# Patient Record
Sex: MALE | Race: WHITE | Employment: UNEMPLOYED | URBAN - METROPOLITAN AREA
[De-identification: names, ages, dates, MRNs, and addresses within clinical notes are randomized per-mention and may not be internally consistent; named-entity substitution may affect disease eponyms.]

---

## 2017-01-01 ENCOUNTER — OFFICE VISIT (OUTPATIENT)
Dept: INTERNAL MEDICINE CLINIC | Age: 62
End: 2017-01-01

## 2017-01-01 ENCOUNTER — TELEPHONE (OUTPATIENT)
Dept: INTERNAL MEDICINE CLINIC | Age: 62
End: 2017-01-01

## 2017-01-01 ENCOUNTER — HOSPITAL ENCOUNTER (OUTPATIENT)
Dept: LAB | Age: 62
Discharge: HOME | End: 2017-06-28
Payer: COMMERCIAL

## 2017-01-01 ENCOUNTER — DOCUMENTATION ONLY (OUTPATIENT)
Dept: INTERNAL MEDICINE CLINIC | Age: 62
End: 2017-01-01

## 2017-01-01 VITALS
BODY MASS INDEX: 40.82 KG/M2 | TEMPERATURE: 98.1 F | HEIGHT: 73 IN | HEART RATE: 79 BPM | OXYGEN SATURATION: 99 % | WEIGHT: 308 LBS

## 2017-01-01 VITALS
RESPIRATION RATE: 16 BRPM | HEART RATE: 34 BPM | TEMPERATURE: 98.8 F | BODY MASS INDEX: 41.35 KG/M2 | WEIGHT: 312 LBS | HEIGHT: 73 IN | OXYGEN SATURATION: 98 %

## 2017-01-01 VITALS
BODY MASS INDEX: 41.08 KG/M2 | WEIGHT: 310 LBS | OXYGEN SATURATION: 96 % | HEART RATE: 64 BPM | HEIGHT: 73 IN | TEMPERATURE: 98.3 F

## 2017-01-01 DIAGNOSIS — Z23 ENCOUNTER FOR IMMUNIZATION: ICD-10-CM

## 2017-01-01 DIAGNOSIS — E78.5 HYPERLIPIDEMIA, UNSPECIFIED HYPERLIPIDEMIA TYPE: ICD-10-CM

## 2017-01-01 DIAGNOSIS — I48.21 PERMANENT ATRIAL FIBRILLATION (HCC): ICD-10-CM

## 2017-01-01 DIAGNOSIS — G47.33 OSA (OBSTRUCTIVE SLEEP APNEA): ICD-10-CM

## 2017-01-01 DIAGNOSIS — K63.5 HYPERPLASTIC COLONIC POLYP, UNSPECIFIED PART OF COLON: ICD-10-CM

## 2017-01-01 DIAGNOSIS — I50.20 SYSTOLIC CONGESTIVE HEART FAILURE, UNSPECIFIED CONGESTIVE HEART FAILURE CHRONICITY: ICD-10-CM

## 2017-01-01 DIAGNOSIS — I10 ESSENTIAL HYPERTENSION: Primary | ICD-10-CM

## 2017-01-01 DIAGNOSIS — R73.01 IFG (IMPAIRED FASTING GLUCOSE): ICD-10-CM

## 2017-01-01 DIAGNOSIS — Z85.528 H/O RENAL CELL CARCINOMA: ICD-10-CM

## 2017-01-01 DIAGNOSIS — I10 ESSENTIAL HYPERTENSION: ICD-10-CM

## 2017-01-01 DIAGNOSIS — Z11.59 NEED FOR HEPATITIS C SCREENING TEST: ICD-10-CM

## 2017-01-01 DIAGNOSIS — I50.20 SYSTOLIC CONGESTIVE HEART FAILURE, UNSPECIFIED CONGESTIVE HEART FAILURE CHRONICITY: Primary | ICD-10-CM

## 2017-01-01 DIAGNOSIS — E66.01 OBESITY, MORBID (HCC): ICD-10-CM

## 2017-01-01 LAB
CHOLEST SERPL-MCNC: 203 MG/DL
HBA1C MFR BLD: 5.3 % (ref 4.2–5.6)
HCV AB SER IA-ACNC: 0.19 INDEX
HCV AB SERPL QL IA: NEGATIVE
HCV COMMENT,HCGAC: NORMAL
HDLC SERPL-MCNC: 40 MG/DL (ref 40–60)
HDLC SERPL: 5.1 {RATIO} (ref 0–5)
LDLC SERPL CALC-MCNC: 93.4 MG/DL (ref 0–100)
LIPID PROFILE,FLP: ABNORMAL
TRIGL SERPL-MCNC: 348 MG/DL (ref ?–150)
VLDLC SERPL CALC-MCNC: 69.6 MG/DL

## 2017-01-01 PROCEDURE — 83036 HEMOGLOBIN GLYCOSYLATED A1C: CPT | Performed by: INTERNAL MEDICINE

## 2017-01-01 PROCEDURE — 86803 HEPATITIS C AB TEST: CPT | Performed by: INTERNAL MEDICINE

## 2017-01-01 PROCEDURE — 36415 COLL VENOUS BLD VENIPUNCTURE: CPT | Performed by: INTERNAL MEDICINE

## 2017-01-01 PROCEDURE — 80061 LIPID PANEL: CPT | Performed by: INTERNAL MEDICINE

## 2017-01-01 RX ORDER — SILDENAFIL 50 MG/1
50 TABLET, FILM COATED ORAL AS NEEDED
COMMUNITY

## 2017-01-01 RX ORDER — CARVEDILOL 25 MG/1
25 TABLET ORAL 2 TIMES DAILY WITH MEALS
COMMUNITY

## 2017-01-01 RX ORDER — LIDOCAINE 50 MG/G
PATCH TOPICAL
Qty: 3 EACH | Refills: 5 | Status: SHIPPED | OUTPATIENT
Start: 2017-01-01 | End: 2017-01-01 | Stop reason: SDUPTHER

## 2017-01-01 RX ORDER — LOTEPREDNOL ETABONATE 5 MG/G
GEL OPHTHALMIC
COMMUNITY

## 2017-01-01 RX ORDER — POLYMYXIN B SULFATE AND TRIMETHOPRIM 1; 10000 MG/ML; [USP'U]/ML
1 SOLUTION OPHTHALMIC EVERY 4 HOURS
COMMUNITY

## 2017-01-01 RX ORDER — BUMETANIDE 0.5 MG/1
TABLET ORAL 4 TIMES DAILY
COMMUNITY

## 2017-01-01 RX ORDER — TRAMADOL HYDROCHLORIDE 50 MG/1
50 TABLET ORAL
Qty: 60 TAB | Refills: 0 | Status: SHIPPED | OUTPATIENT
Start: 2017-01-01

## 2017-01-01 RX ORDER — THERA TABS 400 MCG
1 TAB ORAL DAILY
COMMUNITY

## 2017-01-01 RX ORDER — NYSTATIN 100000 [USP'U]/G
POWDER TOPICAL 4 TIMES DAILY
Qty: 15 G | Refills: 3 | Status: SHIPPED | OUTPATIENT
Start: 2017-01-01

## 2017-01-01 RX ORDER — KETOCONAZOLE 20 MG/ML
5 SHAMPOO TOPICAL
Qty: 1 BOTTLE | Refills: 5 | Status: SHIPPED | OUTPATIENT
Start: 2017-01-01

## 2017-01-01 RX ORDER — TRAMADOL HYDROCHLORIDE 50 MG/1
50 TABLET ORAL
Qty: 60 TAB | Refills: 0 | Status: SHIPPED | OUTPATIENT
Start: 2017-01-01 | End: 2017-01-01 | Stop reason: SDUPTHER

## 2017-01-01 RX ORDER — ALBUTEROL SULFATE 90 UG/1
AEROSOL, METERED RESPIRATORY (INHALATION)
COMMUNITY

## 2017-01-01 RX ORDER — LIDOCAINE 50 MG/G
PATCH TOPICAL
Qty: 5 PACKAGE | Refills: 5 | Status: SHIPPED | OUTPATIENT
Start: 2017-01-01

## 2017-01-01 RX ORDER — NYSTATIN 100000 [USP'U]/G
POWDER TOPICAL 4 TIMES DAILY
Qty: 15 G | Refills: 3 | Status: SHIPPED | OUTPATIENT
Start: 2017-01-01 | End: 2017-01-01 | Stop reason: SDUPTHER

## 2017-01-01 RX ORDER — SENNOSIDES 8.6 MG/1
1 TABLET ORAL DAILY
COMMUNITY

## 2017-01-01 RX ORDER — CARBOXYMETHYLCELLULOSE SODIUM 10 MG/ML
GEL OPHTHALMIC
COMMUNITY

## 2017-01-01 RX ORDER — AMIODARONE HYDROCHLORIDE 200 MG/1
TABLET ORAL
COMMUNITY

## 2017-01-01 RX ORDER — BRIMONIDINE TARTRATE, TIMOLOL MALEATE 2; 5 MG/ML; MG/ML
1 SOLUTION/ DROPS OPHTHALMIC EVERY 12 HOURS
COMMUNITY

## 2017-06-22 NOTE — MR AVS SNAPSHOT
Visit Information Date & Time Provider Department Dept. Phone Encounter #  
 6/22/2017  2:15 PM Mathieu Huber MD Internist of [address] [id number] Your Appointments 7/3/2017  1:30 PM  
ESTABLISHED PATIENT with Connie Duvall MD  
Urology of [facility] [address]) Appt Note: Return in about 4 months (around 6/15/2017) for [place] [address]  
[phone]  
  
   
 [place] [id number]  
  
    
 9/19/2017  1:45 PM  
Office Visit with Mathieu Huber MD  
Internist of [address] [place] [address] [address] [facility]) Appt Note: 6 month  
 [address], [facility] [address] [place]  
  
   
 [address] [facility]  
  
    
 12/12/2017  1:45 PM  
Office Visit with Mathieu Huber MD  
Internist of [address] [place] [address]) Appt Note: 6 mo f/u  
 [address] [facility], [address] [place] [address]  
[phone] Upcoming Health Maintenance Date Due Hepatitis C Screening 1955 LIPID PANEL Q1 11/17/2015 INFLUENZA AGE 9 TO ADULT 8/1/2017 HEMOGLOBIN A1C Q6M 12/22/2017 EYE EXAM RETINAL OR DILATED Q1 2/15/2018 FOOT EXAM Q1 6/22/2018 MICROALBUMIN Q1 6/22/2018 COLONOSCOPY 2/1/2019 DTaP/Tdap/Td series (2 - Td) 3/30/2026 Allergies as of 6/22/2017  Review Complete On: 6/22/2017 By: Mathieu Huber MD  
  
 Severity Noted Reaction Type Reactions Advair Diskus [Fluticasone-salmeterol]    Other (comments) Hot flashes, back pain. Celecoxib  11/10/2014    Swelling Swelling of tongue Crestor [Rosuvastatin]  06/30/2011    Other (comments) Aches. Iodinated Contrast- Oral And Iv Dye  11/10/2014    Other (comments) Loss of consciousness Lisinopril    Swelling Neosporin [Neomycin-bacitracin-polymyxin]    Unable to Obtain Niacin    Unable to Obtain Zolpidem  11/10/2014    Other (comments) [place] Hensen, disconnected LVAD pump, confusion Current Immunizations  Never Reviewed Name Date Influenza Vaccine 9/11/2014 Influenza Vaccine (Quad) PF 3/14/2017 Pneumococcal Conjugate (PCV-13) 3/30/2016 Pneumococcal Polysaccharide (PPSV-23) 3/14/2017 Tdap 3/30/2016 Zoster 7/21/2011 Not reviewed this visit Vitals Pulse Temp Height(growth percentile) Weight(growth percentile) SpO2 BMI  
 64 98.3 °F (36.8 °C) (Oral) 6' 1\" (1.854 m) 310 lb (140.6 kg) 96% 40.9 kg/m2 Smoking Status Former Smoker Vitals History BMI and BSA Data Body Mass Index Body Surface Area 40.9 kg/m 2 2.69 m 2 Preferred Pharmacy Pharmacy Name Phone [id number] MALINDA Orozco - [address] Your Updated Medication List  
  
   
This list is accurate as of: 6/22/17  3:04 PM.  Always use your most recent med list.  
  
  
  
  
 ALPHAGAN P 0.1 % ophthalmic solution Generic drug:  brimonidine  
every eight (8) hours. amLODIPine 10 mg tablet Commonly known as:  Ewkirby Loren Take  by mouth daily. aspirin 325 mg tablet Commonly known as:  ASPIRIN Take 325 mg by mouth daily. atropine 1 % ophthalmic solution Administer 1 Drop to left eye three (3) times daily. bumetanide 0.5 mg tablet Commonly known as:  Meldanielle Martinezer Take  by mouth two (2) times a day. COLACE 100 mg capsule Generic drug:  docusate sodium Take 100 mg by mouth two (2) times a day. COMBIGAN 0.2-0.5 % Drop ophthalmic solution Generic drug:  brimonidine-timolol 1 Drop every twelve (12) hours. ergocalciferol 50,000 unit capsule Commonly known as:  ERGOCALCIFEROL Take 50,000 Units by mouth. ferrous sulfate 325 mg (65 mg iron) tablet Take  by mouth three (3) times daily (with meals). ketoconazole 2 % shampoo Commonly known as:  NIZORAL Apply 5 mL to affected area every seven (7) days. lidocaine 5 % Commonly known as:  Sulaiman Veloz Apply patch to the affected area for 12 hours a day and remove for 12 hours a day. lisinopril 20 mg tablet Commonly known as:  Karene Bolglendy Take  by mouth daily. LOTEMAX 0.5 % Drpg Generic drug:  loteprednol etabonate Apply  to eye.  
  
 neomycin-bacitracin-polymyxin 3.5mg-400 unit- 5,000 unit/gram ointment Commonly known as:  NEOSPORIN Apply  to affected area two (2) times a day. nystatin powder Commonly known as:  MYCOSTATIN Apply  to affected area four (4) times daily. omeprazole 40 mg capsule Commonly known as:  PRILOSEC Take 40 mg by mouth daily. sodium chloride 0.9 % soln 0.1 mL with vancomycin 500 mg solr 1 mg  
1 mg by IntraVITreal route two (2) times a day. tobramycin-dexamethasone ophthalmic ointment Commonly known as:  Beaulah Neither Administer 1 Drop to left eye three (3) times daily. traMADol 50 mg tablet Commonly known as:  ULTRAM  
Take 1 Tab by mouth every six (6) hours as needed. Max Daily Amount: 200 mg.  
  
 warfarin 5 mg tablet Commonly known as:  COUMADIN Take 5 mg by mouth daily. Prescriptions Printed Refills  
 traMADol (ULTRAM) 50 mg tablet 0 Sig: Take 1 Tab by mouth every six (6) hours as needed. Max Daily Amount: 200 mg. Class: Print Route: Oral  
 nystatin (MYCOSTATIN) powder 3 Sig: Apply  to affected area four (4) times daily. Class: Print Route: Topical  
 ketoconazole (NIZORAL) 2 % shampoo 5 Sig: Apply 5 mL to affected area every seven (7) days. Class: Print Route: Topical  
  
Prescriptions Sent to Pharmacy Refills  
 lidocaine (LIDODERM) 5 % 5 Sig: Apply patch to the affected area for 12 hours a day and remove for 12 hours a day. Class: Normal  
 Pharmacy[id number] - [address], [address] [facility] #: [phone] Introducing [facility] SERVICES! Jam Yepez introduces [employer] patient portal. Now you can access parts of your medical record, email your doctor's office, and request medication refills online. 1. In your internet browser, go to [email or web address] 2. Click on the First Time User? Click Here link in the Sign In box. You will see the New Member Sign Up page. 3. Enter your [employer] Access Code exactly as it appears below. You will not need to use this code after youve completed the sign-up process. If you do not sign up before the expiration date, you must request a new code. · [employer] Access Code: [email or web address] Expires: 9/20/2017  3:03 PM 
 
4. Enter the last four digits of your Social Security Number (xxxx) and Date of Birth (mm/dd/yyyy) as indicated and click Submit. You will be taken to the next sign-up page. 5. Create a [employer]Ch[employer] ID. This will be your [employer]Ch[employer] login ID and cannot be changed, so think of one that is secure and easy to remember. 6. Create a [employer] password. You can change your password at any time. 7. Enter your Password Reset Question and Answer. This can be used at a later time if you forget your password. 8. Enter your e-mail address. You will receive e-mail notification when new information is available in [address] 9. Click Sign Up. You can now view and download portions of your medical record. 10. Click the Download Summary menu link to download a portable copy of your medical information. If you have questions, please visit the Frequently Asked Questions section of the [employer] website. Remember, [employer] is NOT to be used for urgent needs. For medical emergencies, dial 911. Now available from your iPhone and Android! Please provide this summary of care documentation to your next provider. Your primary care clinician is listed as Ellen Andrew. If you have any questions after today's visit, please call [phone].

## 2017-06-23 NOTE — PROGRESS NOTES
60 y.o. white male who presents for evaluation. He is doing well cardiac wise and continues to f/u w Dr Jessica McconnellShavonne Ly is planned for his continued indefinite ceftaroline per ID. He tries to walk but fairly limited. Reports that his sugars have been in good range. Denies polyuria, polydipsia, nocturia, vision change. No gi or gu complaints.   Dr Armin Wong sched the f/u US for the left kidney cyst/mass    Past Medical History:   Diagnosis Date    Asbestosis(501) [facility])     Dr. Aashish Wellington; RLD 2/13 pfts fvc 69, fev1 76, ratio 108%, dlco 63%    Calculus of kidney     CHF and cardiomyopathy 3/12 Dr. Oly Tavera     s/p Heartmate II and resection left atrial appendage Dr Mathis Aus 2/13; torn drivelline sheath w exposed wires s/p clam shell repait per [employer] 9/13; pump thrombus 5/14; s/p HM2 explantation 9/16 w HVAD implantation for recurring bacteremia; on indef ceftaroline per ID    Colon polyps     Diabetes mellitus ([address]     controlled off meds    Hemangioma 1996    right frontal Dr. Zain Alonso Hemorrhage of left orbit 11/2015    postop overanticoagulation    Hemorrhoids     Hyperlipidemia     Hypertension     Hypovitaminosis D     Mitral valve disease     Morbid obesity with BMI of 40.0-44.9, adult ([facility])     peak weight 316 lbs, bmi 41.7 from 11/14    NSVT (nonsustained ventricular tachycardia) ([facility])     JIMMIE (obstructive sleep apnea) 2004    Dr. Aashish Wellington in past;  AHI 70, RDI 71 on cpap [facility] currently    PUD (peptic ulcer disease) 1989    Dr. 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; 9/14 Dr Kelsey Schmitz; 11/15 gpc on cx Dr Vern Pierce; 1/16 staph epi neg indium/REGINALDO tx'ed dapto; 3/16 gpc tx'ed dapto Dr Kelsey Schmitz;    [address] [facility] [address] Subarachnoid hemorrhage [facility])     due to overanticoagulation 11/15    Transaminasemia 11/12    resolved    Venous insufficiency      Past Surgical History:   Procedure Laterality Date    CARDIAC CATHETERIZATION  2004     Aguilar clean cornonaries, EF 65%, nl valves, nl B renal aa    CARDIAC SURG PROCEDURE UNLIST  2/13    s/p LVAD placement Healthmate II    CARDIAC SURG PROCEDURE UNLIST      IV antibiotics for infection on dry line that goes itno hear  f    CARDIAC SURG PROCEDURE UNLIST  9/12    s/p AICD    HX APPENDECTOMY      HX CATARACT REMOVAL      2013 bilat [facility]    HX COLONOSCOPY      Dr. Zaid Blood 2/09 polyps/divertics    HX HEENT      HX HEENT      laser surgery    HX ORTHOPAEDIC      heel fracture    HX OTHER SURGICAL  09/2016    LVAD replacement     OH SIGMOIDOSCOPY FLX NDSC US XM  4/11     Social History     Social History    Marital status:      Spouse name: N/A    Number of children: N/A    Years of education: N/A     Occupational History    Not on file. Social History Main Topics    Smoking status: Former Smoker    Smokeless tobacco: Not on file    Alcohol use 1.5 oz/week     3 Cans of beer per week    Drug use: No    Sexual activity: Not on file     Other Topics Concern    Not on file     Social History Narrative     Current Outpatient Prescriptions   Medication Sig    brimonidine (ALPHAGAN P) 0.1 % ophthalmic solution every eight (8) hours.  brimonidine-timolol (COMBIGAN) 0.2-0.5 % drop ophthalmic solution 1 Drop every twelve (12) hours.  loteprednol etabonate (LOTEMAX) 0.5 % drpg Apply  to eye.  traMADol (ULTRAM) 50 mg tablet Take 1 Tab by mouth every six (6) hours as needed. Max Daily Amount: 200 mg.    nystatin (MYCOSTATIN) powder Apply  to affected area four (4) times daily.  ketoconazole (NIZORAL) 2 % shampoo Apply 5 mL to affected area every seven (7) days.  lidocaine (LIDODERM) 5 % Apply patch to the affected area for 12 hours a day and remove for 12 hours a day.  bumetanide (BUMEX) 0.5 mg tablet Take  by mouth two (2) times a day.  atropine 1 % ophthalmic solution Administer 1 Drop to left eye three (3) times daily.     tobramycin-dexamethasone (TOBRADEX) ophthalmic ointment Administer 1 Drop to left eye three (3) times daily.  neomycin-bacitracin-polymyxin (NEOSPORIN) 3.5mg-400 unit- 5,000 unit/gram ointment Apply  to affected area two (2) times a day.  sodium chloride 0.9 % soln 0.1 mL with vancomycin 500 mg solr 1 mg 1 mg by IntraVITreal route two (2) times a day.  amLODIPine (NORVASC) 10 mg tablet Take  by mouth daily.  warfarin (COUMADIN) 5 mg tablet Take 5 mg by mouth daily.  lisinopril (PRINIVIL, ZESTRIL) 20 mg tablet Take  by mouth daily.  omeprazole (PRILOSEC) 40 mg capsule Take 40 mg by mouth daily.  aspirin (ASPIRIN) 325 mg tablet Take 325 mg by mouth daily.  ergocalciferol (ERGOCALCIFEROL) 50,000 unit capsule Take 50,000 Units by mouth.  docusate sodium (COLACE) 100 mg capsule Take 100 mg by mouth two (2) times a day.  ferrous sulfate 325 mg (65 mg iron) tablet Take  by mouth three (3) times daily (with meals). No current facility-administered medications for this visit. Allergies   Allergen Reactions    Advair Diskus [Fluticasone-Salmeterol] Other (comments)     Hot flashes, back pain.  Celecoxib Swelling     Swelling of tongue    Crestor [Rosuvastatin] Other (comments)     Aches.     Iodinated Contrast- Oral And Iv Dye Other (comments)     Loss of consciousness     Lisinopril Swelling    Neosporin [Neomycin-Bacitracin-Polymyxin] Unable to Obtain    Niacin Unable to Obtain    Zolpidem Other (comments)     Wood[place] Jewel, disconnected LVAD pump, confusion     REVIEW OF SYSTEMS: sees [facility], colo 2009 Dr Lu Randolph no vision change or eye pain  Oral  no mouth pain, tongue or tooth problems  Ears  no hearing loss, ear pain, fullness, no swallowing problems  Chest  no breast masses  Resp  no wheezing, chronic coughing, dyspnea  GI  no heartburn, nausea, vomiting, change in bowel habits, bleeding, hemorrhoids  Urinary  no dysuria, hematuria, flank pain, urgency, frequency  Genitals  no genital lesions, discharge, masses, ulceration, warts  Derm  no nail abnormalities, rashes, lesions of note, hair loss  Psych  denies any anxiety or depression symptoms, no hallucinations or violent ideation  Constitutional  no wt loss, night sweats, unexplained fevers    Visit Vitals    Pulse 64    Temp 98.3 °F (36.8 °C) (Oral)    Ht 6' 1\" (1.854 m)    Wt 310 lb (140.6 kg)    SpO2 96%    BMI 40.9 kg/m2   A&O x3  Affect is appropriate. Mood stable  No apparent distress  Anicteric, no JVD, adenopathy or thyromegaly. Left orbit is patched up  No carotid bruits or radiated murmur  Lungs clear to auscultation, no wheezes or rales  Heart showed no murmur, rubs, gallops  Abdomen soft nontender, no hepatosplenomegaly or masses. Extremities without edema.   Pulses 1-2+ symmetrically    LABS  From 12/11 showed gluc 94,   cr 1.06, gfr 78,   alt 40, hba1c 5.4, ldl-p 1446,                                                         wbc 6.7,  hb 15.8, plt 305, psa 1.20  From 4/12 showed   gluc 89,   cr 1.87, gfr 39  From 5/12 showed   gluc 139, cr 1.54, gfr 49  From 7/12 showed   gluc 92,   cr 1.60, gfr 47,   alt 60, hba1c 6.3,                   chol 173, tg 107, hdl 35, ldl-c 117, wbc 8.2,   hb 17.6, plt 300, tsh 6.10  From 10/12 showed gluc 96,   cr 1.35, gfr 57,        chol 157, tg 112, hdl 28, ldl-c 107  From 10/13 showed gluc 92,   cr 1.40, gfr>59,                                wbc 10.7, hb 13.0, plt 309  From 3/14 showed    cr 1.10,               hb 12.1,       psa 1.32  From 9/14 showed                      tsh 1.71  From 10/14 showed gluc 105, cr 0.90, gfr>60,  alt 11,                 wbc 7.1,   hb 11.1, plt 310  From 11/14 showed         hba1c 5.5,         chol 145, tg 175, hdl 34, ldl-c 76,   umar 16  From 6/15 showed   gluc 97,   cr 1.00, gfr>60,  alt 14,                  wbc 9.9,   hb 12.2, plt 323, lip 107  From 3/16 showed   gluc 86,   cr 1.20, gfr>60,  alt 14,                  wbc 5.5,   hb 10.4, plt 201  From 9/16 showed         hba1c 5.5  From 10/16 showed                       tsh 1.95  From 3/17 showed   gluc 89,   cr 1.40,                    wbc 7.5, hb 13.5, plt 241    Patient Active Problem List   Diagnosis Code    Hyperlipidemia E78.5    JIMMIE (obstructive sleep apnea) on CPAP AHI 71 G47.33    CHF and cardiomyopathy 3/12 Dr. Carla Lennox, s/p [facility] 2/13 w explantation and replacement HVAD 9/16 I50.9    Atrial fibrillation Dr. Carla Lennox 3/12 I48.91    Colon polyp and diverticulosis Dr. Maegan Sneed 2009 K63.5    Essential hypertension I10    Obesity (BMI 30-39. 9) E66.9     Assessment and plan:  1. CM s/p HVAD. Follow up Dr Adenike Carreno. Off transplant list at this time  2. Lipids. Check labs at his convenience  3. Polyps and diverticulosis. Fiber  4. Diabetes. Off meds, f/u opth, no podiatry  5. JIMMIE on cpap  6. Obesity. Lifestyle and dietary measures  7. Urology. Per Dr Shepard [facility] 12/17    Above conditions discussed at length and patient vocalized understanding.   All questions answered to patient satifaction

## 2017-08-18 NOTE — TELEPHONE ENCOUNTER
Iram pt's nurse is calling to speak with a nurse about medication    Pt will be getting d/s and is asking if RD will prescribe iv abx?     Chetan # [phone]

## 2017-08-23 NOTE — TELEPHONE ENCOUNTER
Patient is calling because he signed agreement to let us know when he is getting pain killers from another source. He is just calling us to let us know that [facility] and [facility] pain killers.     [phone]

## 2017-12-09 NOTE — PROGRESS NOTES
60 y.o. white male who presents for evaluation. He seems to be doing ok. Stable from heart failure point of view. Ambulation is fairly limited to adls, no set exercise. He is not on transplant list due to weight    ID said no to access placement due to risk of colonization so getting his ceftaroline through the PICC line. Reports that his sugars have been in good range. Denies polyuria, polydipsia, nocturia, vision change. No gi or gu complaints.       He underwent microwave ablation of bx proven RCC, they are following the imaging every 3 mos to decide what to do next    He has continued to f/u w ophth as he's had some retinal bleeds apparently    Past Medical History:   Diagnosis Date    Acute on chronic combined systolic and diastolic heart failure ([address]     Asbestosis(501)     Dr. Wade Moore; RLD 2/13 pfts fvc 69, fev1 76, ratio 108%, dlco 63%    BPH with obstruction/lower urinary tract symptoms     Calculus of kidney     Cardiac arrhythmia     Cataracts, bilateral     CHF and cardiomyopathy 3/12 Dr. Cydney Arenas     s/p Heartmate II and resection left atrial appendage Dr Uli Amado 2/13; torn drivelline sheath w exposed wires s/p clam shell repait per [employer] 9/13; pump thrombus 5/14; s/p HM2 explantation 9/16 w HVAD implantation for recurring bacteremia; on indef ceftaroline per ID    CKD (chronic kidney disease)     Colon polyps     Complex renal cyst     Congestive heart failure ([address]     Diabetes mellitus ([address]     controlled off meds    Esophageal reflux     Glaucoma     Dr Moo Das reduced     Hemangioma 1996    right frontal Dr. Russell Fermin Hemorrhage of left orbit 11/2015    postop overanticoagulation    Hemorrhoids     History of blood transfusion     Hyperlipidemia     Hypertension     Hypovitaminosis D     ICD (implantable cardioverter-defibrillator) infection ([address]     LVAD (left ventricular assist device) present ([address]     Mitral valve disease     Morbid obesity with BMI of 40.0-44.9, adult (HCC)     peak weight 316 lbs, bmi 41.7 from 11/14    Myocardial infarct     Nonischemic cardiomyopathy (HCC)     NSVT (nonsustained ventricular tachycardia) (HCC)     JIMMIE (obstructive sleep apnea) 2004    Dr. Aristides Strong in past;  AHI 70, RDI 71 on cpap [facility] currently    PUD (peptic ulcer disease) 1989    Dr. 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 Lerner Heart clean cornonaries, EF 65%, nl valves, nl B renal aa    CARDIAC SURG PROCEDURE UNLIST  2/13    s/p LVAD placement Healthmate II    CARDIAC SURG PROCEDURE UNLIST      IV antibiotics for infection on dry line that goes itno hear  f    CARDIAC SURG PROCEDURE UNLIST  9/12    s/p AICD    HX APPENDECTOMY      HX CATARACT REMOVAL      2013 bilat [facility]    HX COLONOSCOPY      Dr. Katelynn Paulson 2/09 polyps/divertics    HX HEENT      HX HEENT      laser surgery    HX ORTHOPAEDIC      heel fracture    HX OTHER SURGICAL  09/2016    LVAD replacement     HX UROLOGICAL      LA SIGMOIDOSCOPY FLX [address] [facility]  4/11     Social History     Social History    Marital status:      Spouse name: N/A    Number of children: N/A    Years of education: N/A     Occupational History    Not on file.      Social History Main Topics    Smoking status: Former Smoker     Packs/day: 0.50     Years: 3.00     Types: Cigarettes    Smokeless tobacco: Never Used    Alcohol use 1.5 oz/week     3 Cans of beer per week   Tish Neville Drug use: No    Sexual activity: Not on file     Other Topics Concern    Not on file     Social History Narrative     Current Outpatient Prescriptions   Medication Sig    carvedilol (COREG) 25 mg tablet Take 25 mg by mouth two (2) times daily (with meals).  octreotide ACETATE (SANDOSTATIN LAR) 30 mg injection 30 mg by IntraMUSCular route every twenty-eight (28) days.  amiodarone (CORDARONE) 200 mg tablet Take  by mouth.  albuterol (PROVENTIL HFA, VENTOLIN HFA, PROAIR HFA) 90 mcg/actuation inhaler Take  by inhalation.  carboxymethylcellulose sodium 1 % dlgl Apply  to eye.  senna (SENOKOT) 8.6 mg tablet Take 1 Tab by mouth daily.  sildenafil citrate (VIAGRA) 50 mg tablet Take 50 mg by mouth as needed.  therapeutic multivitamin (THERAGRAN) tablet Take 1 Tab by mouth daily.  trimethoprim-polymyxin b (POLYTRIM) ophthalmic solution Administer 1 Drop to both eyes every four (4) hours.  traMADol (ULTRAM) 50 mg tablet Take 1 Tab by mouth every six (6) hours as needed. Max Daily Amount: 200 mg.    lidocaine (LIDODERM) 5 % Apply patch to the affected area for 12 hours a day and remove for 12 hours a day.  nystatin (MYCOSTATIN) powder Apply  to affected area four (4) times daily.  brimonidine-timolol (COMBIGAN) 0.2-0.5 % drop ophthalmic solution 1 Drop every twelve (12) hours.  loteprednol etabonate (LOTEMAX) 0.5 % drpg Apply  to eye.  bumetanide (BUMEX) 0.5 mg tablet Take  by mouth four (4) times daily.  neomycin-bacitracin-polymyxin (NEOSPORIN) 3.5mg-400 unit- 5,000 unit/gram ointment Apply  to affected area two (2) times a day.  amLODIPine (NORVASC) 10 mg tablet Take  by mouth daily.  warfarin (COUMADIN) 5 mg tablet Take 7.5 mg by mouth daily.  lisinopril (PRINIVIL, ZESTRIL) 20 mg tablet Take  by mouth daily.  omeprazole (PRILOSEC) 40 mg capsule Take 40 mg by mouth daily.  aspirin (ASPIRIN) 325 mg tablet Take 325 mg by mouth daily.     ergocalciferol (ERGOCALCIFEROL) 50,000 unit capsule Take 50,000 Units by mouth.  docusate sodium (COLACE) 100 mg capsule Take 100 mg by mouth two (2) times a day.  ferrous sulfate 325 mg (65 mg iron) tablet Take  by mouth three (3) times daily (with meals).  predniSONE (DELTASONE) 50 mg tablet Take 1 tablet 13 hours prior to procedure then 7 hours and 1 hour prior to procedure    brimonidine (ALPHAGAN P) 0.1 % ophthalmic solution every eight (8) hours.  ketoconazole (NIZORAL) 2 % shampoo Apply 5 mL to affected area every seven (7) days.  atropine 1 % ophthalmic solution Administer 1 Drop to left eye three (3) times daily.  tobramycin-dexamethasone (TOBRADEX) ophthalmic ointment Administer 1 Drop to left eye three (3) times daily.  sodium chloride 0.9 % soln 0.1 mL with vancomycin 500 mg solr 1 mg 1 mg by IntraVITreal route two (2) times a day. No current facility-administered medications for this visit. Allergies   Allergen Reactions    Advair Diskus [Fluticasone-Salmeterol] Other (comments)     Hot flashes, back pain.  Celecoxib Swelling     Swelling of tongue    Crestor [Rosuvastatin] Other (comments)     Aches.     Iodinated Contrast- Oral And Iv Dye Other (comments)     Loss of consciousness     Lisinopril Swelling    Neosporin [Neomycin-Bacitracin-Polymyxin] Unable to Obtain    Niacin Unable to Obtain    Zolpidem Other (comments)     Zach Judd, disconnected LVAD pump, confusion     REVIEW OF SYSTEMS: sees [facility], colo 2009 Dr Nikolas Dunbar no vision change or eye pain  Oral  no mouth pain, tongue or tooth problems  Ears  no hearing loss, ear pain, fullness, no swallowing problems  Chest  no breast masses  Resp  no wheezing, chronic coughing, dyspnea  GI  no heartburn, nausea, vomiting, change in bowel habits, bleeding, hemorrhoids  Urinary  no dysuria, hematuria, flank pain, urgency, frequency  Genitals  no genital lesions, discharge, masses, ulceration, warts  Derm  no nail abnormalities, rashes, lesions of note, hair loss  Psych  denies any anxiety or depression symptoms, no hallucinations or violent ideation  Constitutional  no wt loss, night sweats, unexplained fevers    Visit Vitals    Pulse (!) 34    Temp 98.8 °F (37.1 °C) (Oral)    Resp 16    Ht 6' 1\" (1.854 m)    Wt 312 lb (141.5 kg)    SpO2 98%    BMI 41.16 kg/m2   A&O x3  Affect is appropriate. Mood stable  No apparent distress  Anicteric, no JVD, adenopathy or thyromegaly. No carotid bruits or radiated murmur  Lungs clear to auscultation, no wheezes or rales  Heart showed continuous hum, no murmur  Abdomen soft nontender, no hepatosplenomegaly or masses. Extremities without edema.   Pulses 1-2+ symmetrically    LABS  From 12/11 showed gluc 94,   cr 1.06, gfr 78,   alt 40, hba1c 5.4, ldl-p 1446,                                                         wbc 6.7,  hb 15.8, plt 305, psa 1.20  From 4/12 showed   gluc 89,   cr 1.87, gfr 39  From 5/12 showed   gluc 139, cr 1.54, gfr 49  From 7/12 showed   gluc 92,   cr 1.60, gfr 47,   alt 60, hba1c 6.3,                   chol 173, tg 107, hdl 35, ldl-c 117, wbc 8.2,   hb 17.6, plt 300, tsh 6.10  From 10/12 showed gluc 96,   cr 1.35, gfr 57,        chol 157, tg 112, hdl 28, ldl-c 107  From 10/13 showed gluc 92,   cr 1.40, gfr>59,                                wbc 10.7, hb 13.0, plt 309  From 3/14 showed    cr 1.10,               hb 12.1,       psa 1.32  From 9/14 showed                      tsh 1.71  From 10/14 showed gluc 105, cr 0.90, gfr>60,  alt 11,                 wbc 7.1,   hb 11.1, plt 310  From 11/14 showed         hba1c 5.5,         chol 145, tg 175, hdl 34, ldl-c 76,   umar 16  From 6/15 showed   gluc 97,   cr 1.00, gfr>60,  alt 14,                  wbc 9.9,   hb 12.2, plt 323, lip 107  From 3/16 showed   gluc 86,   cr 1.20, gfr>60,  alt 14,                  wbc 5.5,   hb 10.4, plt 201  From 9/16 showed         hba1c 5.5  From 10/16 showed tsh 1.95  From 3/17 showed   gluc 89,   cr 1.40,                    wbc 7.5,   hb 13.5, plt 241  From 6/17 showed        hba1c 5.3,      chol 203, tg 348, hdl 40, ldl-c 93,          hep c neg  From 7/17 showed   gluc 69,   cr 1.70,                    wbc 8.5,   hb 14.7, plt 245    Patient Active Problem List   Diagnosis Code    Hyperlipidemia E78.5    JIMMIE (obstructive sleep apnea) on CPAP AHI 71 G47.33    CHF and cardiomyopathy 3/12 Dr. Vena Ganser, s/p [facility] 2/13 w explantation and replacement HVAD 9/16 I50.9    Atrial fibrillation Dr. Vena Ganser 3/12 I48.91    Colon polyp and diverticulosis Dr. Gracie Riley 2009 K63.5    Essential hypertension I10    Obesity, morbid ([address] E66.01    H/O renal cell carcinoma Z85.528     Assessment and plan:  1. CM s/p HVAD. Follow up Dr Caro Pino. Off transplant list at this time  2. Lipids. As above  3. Polyps and diverticulosis. Fiber  4. Diabetes. Off meds, f/u opth and get records, no podiatry  5. JIMMIE on cpap  6. Obesity. Lifestyle and dietary measures  7. Left RCC s/p ablation. F/U Dr Satish Baker  8. Refilled meds as requested        RTC 6/18    Above conditions discussed at length and patient vocalized understanding.   All questions answered to patient satifaction

## 2017-12-12 PROBLEM — E66.01 OBESITY, MORBID (HCC): Status: ACTIVE | Noted: 2017-01-01

## 2017-12-12 NOTE — MR AVS SNAPSHOT
Visit Information Date & Time Provider Department Dept. Phone Encounter #  
 12/12/2017  1:45 PM Jacinto Guzman MD [facility] [phone] [id number] Upcoming Health Maintenance Date Due HEMOGLOBIN A1C Q6M 12/28/2017 EYE EXAM RETINAL OR DILATED Q1 2/15/2018 FOOT EXAM Q1 6/22/2018 MICROALBUMIN Q1 6/22/2018 LIPID PANEL Q1 6/28/2018 DTaP/Tdap/Td series (2 - Td) 3/30/2026 COLONOSCOPY 10/30/2027 Allergies as of 12/12/2017  Review Complete On: 12/12/2017 By: Christine Ramirez Severity Noted Reaction Type Reactions Advair Diskus [Fluticasone-salmeterol]    Other (comments) Hot flashes, back pain. Celecoxib  11/10/2014    Swelling Swelling of tongue Crestor [Rosuvastatin]  06/30/2011    Other (comments) Aches. Iodinated Contrast- Oral And Iv Dye  11/10/2014    Other (comments) Loss of consciousness Lisinopril    Swelling Neosporin [Neomycin-bacitracin-polymyxin]    Unable to Obtain Niacin    Unable to Obtain Zolpidem  11/10/2014    Other (comments) Gabi Varela, disconnected LVAD pump, confusion Current Immunizations  Reviewed on 6/22/2017 Name Date Influenza Vaccine 9/11/2014 Influenza Vaccine (Quad) PF 3/14/2017 Pneumococcal Conjugate (PCV-13) 3/30/2016 Pneumococcal Polysaccharide (PPSV-23) 3/14/2017 Tdap 3/30/2016 Zoster 7/21/2011 Not reviewed this visit Vitals Pulse Temp Resp Height(growth percentile) Weight(growth percentile) SpO2  
 (!) 34 98.8 °F (37.1 °C) (Oral) 16 6' 1\" (1.854 m) 312 lb (141.5 kg) 98% BMI Smoking Status 41.16 kg/m2 Former Smoker Vitals History BMI and BSA Data Body Mass Index Body Surface Area  
 41.16 kg/m 2 2.7 m 2 Preferred Pharmacy Pharmacy Name Phone [id number] [address] [address] Your Updated Medication List  
  
   
 This list is accurate as of: 12/12/17  2:45 PM.  Always use your most recent med list.  
  
  
  
  
 albuterol 90 mcg/actuation inhaler Commonly known as:  PROVENTIL HFA, VENTOLIN HFA, PROAIR HFA Take  by inhalation. ALPHAGAN P 0.1 % ophthalmic solution Generic drug:  brimonidine  
every eight (8) hours. amiodarone 200 mg tablet Commonly known as:  CORDARONE Take  by mouth. amLODIPine 10 mg tablet Commonly known as:  [place] Take  by mouth daily. aspirin 325 mg tablet Commonly known as:  ASPIRIN Take 325 mg by mouth daily. atropine 1 % ophthalmic solution Administer 1 Drop to left eye three (3) times daily. bumetanide 0.5 mg tablet Commonly known as:  Foster Raul Take  by mouth four (4) times daily. carboxymethylcellulose sodium 1 % Dlgl Apply  to eye.  
  
 carvedilol 25 mg tablet Commonly known as:  Media Bliss Take 25 mg by mouth two (2) times daily (with meals). COLACE 100 mg capsule Generic drug:  docusate sodium Take 100 mg by mouth two (2) times a day. COMBIGAN 0.2-0.5 % Drop ophthalmic solution Generic drug:  brimonidine-timolol 1 Drop every twelve (12) hours. ergocalciferol 50,000 unit capsule Commonly known as:  ERGOCALCIFEROL Take 50,000 Units by mouth. ferrous sulfate 325 mg (65 mg iron) tablet Take  by mouth three (3) times daily (with meals). ketoconazole 2 % shampoo Commonly known as:  NIZORAL Apply 5 mL to affected area every seven (7) days. lidocaine 5 % Commonly known as:  Jay Andradeler Apply patch to the affected area for 12 hours a day and remove for 12 hours a day. lisinopril 20 mg tablet Commonly known as:  Tj Issa Take  by mouth daily. LOTEMAX 0.5 % Drpg Generic drug:  loteprednol etabonate Apply  to eye.  
  
 neomycin-bacitracin-polymyxin 3.5mg-400 unit- 5,000 unit/gram ointment Commonly known as:  NEOSPORIN  
 Apply  to affected area two (2) times a day. nystatin powder Commonly known as:  MYCOSTATIN Apply  to affected area four (4) times daily. octreotide ACETATE 30 mg injection Commonly known as:  SANDOSTATIN LAR  
30 mg by IntraMUSCular route every twenty-eight (28) days. omeprazole 40 mg capsule Commonly known as:  PRILOSEC Take 40 mg by mouth daily. predniSONE 50 mg tablet Commonly known as:  [facility] Take 1 tablet 13 hours prior to procedure then 7 hours and 1 hour prior to procedure  
  
 senna 8.6 mg tablet Commonly known as:  Peter Desireemaggy [place] Take 1 Tab by mouth daily. sildenafil citrate 50 mg tablet Commonly known as:  VIAGRA Take 50 mg by mouth as needed. sodium chloride 0.9 % soln 0.1 mL with vancomycin 500 mg solr 1 mg  
1 mg by IntraVITreal route two (2) times a day. therapeutic multivitamin tablet Commonly known as:  [facility] Take 1 Tab by mouth daily. tobramycin-dexamethasone ophthalmic ointment Commonly known as:  Wilberto Ruiz Administer 1 Drop to left eye three (3) times daily. traMADol 50 mg tablet Commonly known as:  ULTRAM  
Take 1 Tab by mouth every six (6) hours as needed. Max Daily Amount: 200 mg.  
  
 trimethoprim-polymyxin b ophthalmic solution Commonly known as:  POLYTRIM Administer 1 Drop to both eyes every four (4) hours. warfarin 5 mg tablet Commonly known as:  COUMADIN Take 7.5 mg by mouth daily. Prescriptions Printed Refills  
 traMADol (ULTRAM) 50 mg tablet 0 Sig: Take 1 Tab by mouth every six (6) hours as needed. Max Daily Amount: 200 mg. Class: Print Route: Oral  
 lidocaine (LIDODERM) 5 % 5 Sig: Apply patch to the affected area for 12 hours a day and remove for 12 hours a day. Class: Print  
 nystatin (MYCOSTATIN) powder 3 Sig: Apply  to affected area four (4) times daily. Class: Print Route: Topical  
  
Introducing [facility]! Dear Lilliana Backbone: Thank you for requesting a [employer] account. Our records indicate that you already have an active [employer] account. You can access your account anytime at [email or web address] Did you know that you can access your hospital and ER discharge instructions at any time in [employer]? You can also review all of your test results from your hospital stay or ER visit. Additional Information If you have questions, please visit the Frequently Asked Questions section of the [employer] website at [email or web address] Remember, [employer] is NOT to be used for urgent needs. For medical emergencies, dial 911. Now available from your iPhone and Android! Please provide this summary of care documentation to your next provider. Your primary care clinician is listed as Joel Carballo. If you have any questions after today's visit, please call [phone].

## 2017-12-12 NOTE — PROGRESS NOTES
1. Have you been to the ER, urgent care clinic or hospitalized since your last visit? YES. [facility] 11/3/17    2. Have you seen or consulted any other health care providers outside of the [address] [place] [address] since your last visit (Include any pap smears or colon screening)? NO      Do you have an Advanced Directive?  YES

## 2017-12-19 PROBLEM — Z85.528 H/O RENAL CELL CARCINOMA: Status: ACTIVE | Noted: 2017-01-01

## 2018-06-25 ENCOUNTER — TELEPHONE (OUTPATIENT)
Dept: INTERNAL MEDICINE CLINIC | Age: 63
End: 2018-06-25